# Patient Record
Sex: MALE | Race: WHITE | Employment: STUDENT | URBAN - METROPOLITAN AREA
[De-identification: names, ages, dates, MRNs, and addresses within clinical notes are randomized per-mention and may not be internally consistent; named-entity substitution may affect disease eponyms.]

---
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
## 2025-05-02 ENCOUNTER — NURSE TRIAGE (OUTPATIENT)
Dept: OTHER | Facility: CLINIC | Age: 20
End: 2025-05-02

## 2025-05-02 NOTE — TELEPHONE ENCOUNTER
Reason for Disposition   [1] Chest pain lasts > 5 minutes AND [2] described as crushing, pressure-like, or heavy    Protocols used: Chest Pain-ADULT-  Location of patient: [place]    Subjective: Caller states that his heart is burning and feels really heavy; Pt states that it feels so heavy that it is hard to get a full breath in and he feels like he has to try to expand his chest more to breath; Pt states he has no history of heart issues or anxiety    Current Symptoms: Burning chest pain and heavy chest pn    Onset: Just now suddenly    Associated Symptoms: NA    Pain Severity:  Pt states severe /10; burning; severe      LMP: NA Pregnant: NA    Recommended disposition: Call  Now    Care advice provided, patient verbalizes understanding; denies any other questions or concerns; instructed to call back for any new or worsening symptoms.    Patient/caller agrees to proceed to nearest Emergency Department. Pt states he doesn't want to call 911 or go by EMS, that he prefers to just go to the ED.    Attention Provider:  Thank you for allowing me to participate in the care of your patient.  The patient was connected to triage in response to symptoms provided.   Please do not respond through this encounter as the response is not directed to a shared pool.